# Patient Record
Sex: MALE | Race: WHITE | URBAN - METROPOLITAN AREA
[De-identification: names, ages, dates, MRNs, and addresses within clinical notes are randomized per-mention and may not be internally consistent; named-entity substitution may affect disease eponyms.]

---

## 2017-07-21 ENCOUNTER — OFFICE VISIT (OUTPATIENT)
Dept: FAMILY MEDICINE CLINIC | Facility: CLINIC | Age: 32
End: 2017-07-21

## 2017-07-21 VITALS
HEART RATE: 78 BPM | WEIGHT: 160 LBS | RESPIRATION RATE: 14 BRPM | TEMPERATURE: 98 F | DIASTOLIC BLOOD PRESSURE: 72 MMHG | SYSTOLIC BLOOD PRESSURE: 98 MMHG

## 2017-07-21 DIAGNOSIS — T67.8XXA: Primary | ICD-10-CM

## 2017-07-21 DIAGNOSIS — B35.1 TOENAIL FUNGUS: ICD-10-CM

## 2017-07-21 PROCEDURE — 99202 OFFICE O/P NEW SF 15 MIN: CPT | Performed by: NURSE PRACTITIONER

## 2017-07-21 NOTE — PROGRESS NOTES
CHIEF COMPLAINT:   Patient presents with:  Abdominal Pain      HPI:   Kathleen Sheth is a 28year old male who presents for complaints of mild nausea and fatigue. Symptoms have been present for 1  days. Frequency: intermittent.  Symptoms are improvi THROAT: oral mucosa pink, moist. Posterior pharynx is not erythematous. No exudates. NECK: supple,no adenopathy  LUNGS: clear to auscultation bilaterally. No wheezing, rales, or rhonchi. CARDIO: RRR without murmur  GI: No visible scars or masses.  BS's Nail Fungal Infection  A nail fungal infection changes the way fingernails and toenails look. They may thicken, discolor, change shape, or split. This condition is hard to treat because nails grow slowly and have limited blood supply.  The infection often c Call your healthcare provider right away if any of these occur:  · Skin by the nail becomes red, swollen, painful, or drains pus (a creamy yellow or white liquid)  · Side effects from oral anti-fungal medicines  Date Last Reviewed: 8/1/2016 © 2000-2016 Th · Treat for shock or provide rescue breathing or CPR, if needed. Date Last Reviewed: 7/15/2015  © 9536-2936 [address], [address] [place] [address]. All rights reserved.  This information is not intended as a substitute for pr · Headache or nausea  · Rapid, weak, or irregular pulse  · Feeling dizzy, confused, or delirious  · Fainting  · Convulsions  Treatment: Someone should call for emergency help right away.  While waiting for emergency help, the affected person should:  · Rest

## 2017-07-21 NOTE — PATIENT INSTRUCTIONS
Nail Fungal Infection  A nail fungal infection changes the way fingernails and toenails look. They may thicken, discolor, change shape, or split. This condition is hard to treat because nails grow slowly and have limited blood supply.  The infection often Call your healthcare provider right away if any of these occur:  · Skin by the nail becomes red, swollen, painful, or drains pus (a creamy yellow or white liquid)  · Side effects from oral anti-fungal medicines  Date Last Reviewed: 8/1/2016 © 2000-2016 Th · Treat for shock or provide rescue breathing or CPR, if needed. Date Last Reviewed: 7/15/2015  © 6308-3893 [address], [address] [place] [address]. All rights reserved.  This information is not intended as a substitute for pr · Headache or nausea  · Rapid, weak, or irregular pulse  · Feeling dizzy, confused, or delirious  · Fainting  · Convulsions  Treatment: Someone should call for emergency help right away.  While waiting for emergency help, the affected person should:  · Rest

## 2017-08-24 ENCOUNTER — OFFICE VISIT (OUTPATIENT)
Dept: FAMILY MEDICINE CLINIC | Facility: CLINIC | Age: 32
End: 2017-08-24

## 2017-08-24 VITALS
DIASTOLIC BLOOD PRESSURE: 84 MMHG | BODY MASS INDEX: 25.83 KG/M2 | RESPIRATION RATE: 16 BRPM | SYSTOLIC BLOOD PRESSURE: 120 MMHG | WEIGHT: 155 LBS | OXYGEN SATURATION: 98 % | HEIGHT: 65 IN | TEMPERATURE: 98 F | HEART RATE: 85 BPM

## 2017-08-24 DIAGNOSIS — T78.1XXA ALLERGIC REACTION TO FOOD, INITIAL ENCOUNTER: Primary | ICD-10-CM

## 2017-08-24 PROCEDURE — 99212 OFFICE O/P EST SF 10 MIN: CPT | Performed by: NURSE PRACTITIONER

## 2017-08-24 NOTE — PROGRESS NOTES
CHIEF COMPLAINT:   Patient presents with: Allergic Rxn Allergies (immune): shrimp         HPI:   Abhijeet Saleem is a 28year old male who presents for evaluation of a rash. Per patient rash started in the past this morning.  Rash has been cleared si NEURO: Denies abnormal sensation, tingling of the skin, or numbness.       EXAM:   /84   Pulse 85   Temp 97.7 °F (36.5 °C) (Oral)   Resp 16   Ht 65\"   Wt 155 lb   SpO2 98%   BMI 25.79 kg/m²   GENERAL: well developed, well nourished,in no apparent dis Do not take if need to operate heavy machinery or drive a car or tasks that require mental focus. May take zantac as directed on packaging as well. Do not eat shellfish or shrimp    Any swelling of lips, tongue, or throat seek emergency care. · Avoid tobacco and alcohol because they can make symptoms worse. They can also interact with the medicines you are taking to treat the allergic reaction. · If you know what foods caused your reaction today, avoid them in the future.  The next and each israel · If allergy medicines with diphenhydramine make you too sleepy, talk with your healthcare provider. He or she can recommend an over-the counter antihistamine that won't make you sleepy. These may not work as well, though.   Follow-up care  Follow up with judy DIPHENHYDRAMINE (zamzam Mccoy) is an antihistamine. It is used to treat the symptoms of an allergic reaction. It is also used to treat Parkinson's disease.  This medicine is also used to prevent and to treat motion sickness and as a nighttime sleep · medicines for movement abnormalities or Parkinson's disease  · medicines for sleep  · other medicines for cold, cough or allergy  · some medicines for the stomach like chlordiazepoxide, dicyclomine  What if I miss a dose?   If you miss a dose, take it as You may get drowsy or dizzy. Do not drive, use machinery, or do anything that needs mental alertness until you know how this medicine affects you. Do not stand or sit up quickly, especially if you are an older patient.  This reduces the risk of dizzy or mari · dificultad para orinar  · sangrado o magulladuras inusuales  · cansancio o debilidad inusual  Efectos secundarios que, por lo general, no requieren atención médica (debe informarlos a rae médico o a rae profesional de la monse si persisten o si son [employer] · úlceras u otros problemas estomacales  · abby reacción alérgica o inusual a la difenhidramina, a otros medicamentos, alimentos, colorantes o conservantes tales baldemar sulfitos  · si está embarazada o buscando quedar embarazada  · si está amamantando a un be La DIFENHIDRAMINA es un antihistamínico. Se utiliza para tratar los síntomas de abby reacción alérgica. Se utiliza también para tratar la enfermedad de Parkinson.  Melissa medicamento también se [place] para prevenir y tratar los mareos provocados por el movimi · barbitúricos, baldemar fenobarbital  · medicamentos para los espasmos de la vejiga, tales baldemar oxibutinina, tolterodina  · medicamentos para la presión sanguínea  · medicamentos para la depresión, ansiedad y trastornos psicóticos  · medicamentos para movimie Melissa medicamento puede resecarle los ojos y provocar visión borrosa. Si [place] lentes de contacto, puede sentir ciertas molestias. Las gotas lubricantes pueden ser [employer]. Si el problema no desaparece o es severo, consulte a rae médico de los ojos.   Puede expe 2) Evite usar ropa Jean-Paul y cualquier cosa que aumente la temperatura de la piel (duchas o carole calientes, la gabrielle directa del sol), dado que el calor empeorará la comezón.   3) Puede aplicar compresas de hielo para aliviar áreas locales enrojecidas y con -- [place]inchazón nueva o que va empeorando en la chad, los párpados, los labios, la boca, la garganta o la [place]. -- Silver Bigness. Date Last Reviewed: 7/30/2015  © 1879-2855 The [facility], [address] [place], [place] [address]ClementsMiky Contreras.

## 2017-08-24 NOTE — PATIENT INSTRUCTIONS
Benadryl as directed on packaging. Do not take if need to operate heavy machinery or drive a car or tasks that require mental focus. May take zantac as directed on packaging as well.      Do not eat shellfish or shrimp    Any swelling of lips, tongu · Avoid tobacco and alcohol because they can make symptoms worse. They can also interact with the medicines you are taking to treat the allergic reaction. · If you know what foods caused your reaction today, avoid them in the future.  The next and each israel · If allergy medicines with diphenhydramine make you too sleepy, talk with your healthcare provider. He or she can recommend an over-the counter antihistamine that won't make you sleepy. These may not work as well, though.   Follow-up care  Follow up with judy DIPHENHYDRAMINE (zamzam Slaughter) is an antihistamine. It is used to treat the symptoms of an allergic reaction. It is also used to treat Parkinson's disease.  This medicine is also used to prevent and to treat motion sickness and as a nighttime sleep · medicines for movement abnormalities or Parkinson's disease  · medicines for sleep  · other medicines for cold, cough or allergy  · some medicines for the stomach like chlordiazepoxide, dicyclomine  What if I miss a dose?   If you miss a dose, take it as You may get drowsy or dizzy. Do not drive, use machinery, or do anything that needs mental alertness until you know how this medicine affects you. Do not stand or sit up quickly, especially if you are an older patient.  This reduces the risk of dizzy or mari · dificultad para orinar  · sangrado o magulladuras inusuales  · cansancio o debilidad inusual  Efectos secundarios que, por lo general, no requieren atención médica (debe informarlos a rae médico o a rae profesional de la monse si persisten o si son [employer] · úlceras u otros problemas estomacales  · abby reacción alérgica o inusual a la difenhidramina, a otros medicamentos, alimentos, colorantes o conservantes tales baldemar sulfitos  · si está embarazada o buscando quedar embarazada  · si está amamantando a un be La DIFENHIDRAMINA es un antihistamínico. Se utiliza para tratar los síntomas de abby reacción alérgica. Se utiliza también para tratar la enfermedad de Parkinson.  Melissa medicamento también se [place] para prevenir y tratar los mareos provocados por el movimi · barbitúricos, baldemar fenobarbital  · medicamentos para los espasmos de la vejiga, tales baldemar oxibutinina, tolterodina  · medicamentos para la presión sanguínea  · medicamentos para la depresión, ansiedad y trastornos psicóticos  · medicamentos para movimie Melissa medicamento puede resecarle los ojos y provocar visión borrosa. Si [place] lentes de contacto, puede sentir ciertas molestias. Las gotas lubricantes pueden ser [employer]. Si el problema no desaparece o es severo, consulte a rae médico de los ojos.   Puede expe 2) Evite usar ropa Jean-Paul y cualquier cosa que aumente la temperatura de la piel (duchas o carole calientes, la gabrielle directa del sol), dado que el calor empeorará la comezón.   3) Puede aplicar compresas de hielo para aliviar áreas locales enrojecidas y con -- Hinchazón nueva o que va empeorando en la chad, los párpados, los labios, la boca, la garganta o la [place]. -- Renetta Betts. Date Last Reviewed: 7/30/2015  © 4286-8105 [address], [address] [place], [place] [address]WyldwoodMiky Contreras.

## 2018-03-22 ENCOUNTER — OFFICE VISIT (OUTPATIENT)
Dept: FAMILY MEDICINE CLINIC | Facility: CLINIC | Age: 33
End: 2018-03-22

## 2018-03-22 VITALS
RESPIRATION RATE: 16 BRPM | SYSTOLIC BLOOD PRESSURE: 114 MMHG | TEMPERATURE: 98 F | DIASTOLIC BLOOD PRESSURE: 76 MMHG | BODY MASS INDEX: 27.49 KG/M2 | OXYGEN SATURATION: 97 % | WEIGHT: 165 LBS | HEIGHT: 65 IN | HEART RATE: 73 BPM

## 2018-03-22 DIAGNOSIS — R42 DIZZINESS: Primary | ICD-10-CM

## 2018-03-22 DIAGNOSIS — R11.0 NAUSEA: ICD-10-CM

## 2018-03-22 DIAGNOSIS — R63.8 INSUFFICIENT INTAKE OF FOOD AND WATER: ICD-10-CM

## 2018-03-22 PROCEDURE — 99212 OFFICE O/P EST SF 10 MIN: CPT | Performed by: NURSE PRACTITIONER

## 2018-03-22 RX ORDER — MECLIZINE HCL 12.5 MG/1
12.5 TABLET ORAL 3 TIMES DAILY PRN
Qty: 10 TABLET | Refills: 0 | Status: SHIPPED | OUTPATIENT
Start: 2018-03-22 | End: 2018-03-25

## 2018-03-22 NOTE — PATIENT INSTRUCTIONS
No exercise today, increase water consumption today - at least 8 glasses of water today. Meclizine tablets or capsules  Brand Names: Antivert, Dramamine Less Drowsy, Meni-D  ¿Qué es elizabeth medicamento?   [employer] es un [address] alcohol antihistamínicos para [facility], tos y resfrío ciertos medicamentos para la ansiedad o para conciliar el sueño ciertos medicamentos para la depresión, baldemar amitriptilina, fluoxetina, sertralina ciertos medicamentos para convulsiones, tales baldemar fenob Puede experimentar somnolencia o mareos. No conduzca, no utilice maquinaria ni deejay nada que le exija permanecer en estado de alerta hasta que sepa cómo le afecta elizabeth medicamento.  No se siente ni se ponga de pie con rapidez, especialmente si es un pacient El examen que le hicieron hoy no nos permitió establecer con certeza a qué se debió rae mareo. En ocasiones, es necesario hacer más pruebas para poder encontrar la causa.  Por lo tanto, es importante que deejay las visitas de control con rae médico si los sínto © 7693-5665 The [address]. [facility], [facility]. Todos los derechos reservados. Esta información no pretende sustituir la atención médica profesional. Sólo rae médico puede diagnosticar y tratar un problema de monse. · Si tiene fiebre, [employer] o la jaylon a causa de un resfriado o la gripe, puede usar acetaminofén o ibuprofeno, a menos que le hayan recetado otro medicamento para esto. Si tiene [facility] enfermedad crónica del hígado o de los riñones, o hong teni

## 2018-03-22 NOTE — PROGRESS NOTES
CHIEF COMPLAINT:     Patient presents with:  Dizziness: stable, X this AM, nausea - stable from this AM  :      HPI:     Imer Hubbard is a 28year old male presents with complaints of dizziness. Patient has had symptoms for 1 day.  Patient has had HENT: Denies facial weakness, congestion, rhinorrhea, sore throat, No diminished hearing, No aural fullness, No  tinnitus.   CHEST: Denies chest pain, or palpitations  LUNGS: Denies shortness of breath, cough, or wheezing  GI: Denies abdominal pain, + nause Pt had reported in past some dizziness after not drinking water and working out heavily, as in this current scenario. PT has been working out daily for at least 2.5 hours daily, playing soccer 3 times per week, and has a job in manual labor.  Pt reported no [place] medicamento por vía oral con un vaso de agua. Siga las instrucciones de la etiqueta del [place]. Si está tomando elizabeth medicamento para evitar los mareos por el movimiento, tome la dosis por lo menos 1 hora antes de viajar.  Si el medicamento l ¿Qué sucede si me olvido de abby dosis? Si olvida abby dosis, tómela lo antes posible. Si es callie la hora de la próxima dosis, tome sólo aziza dosis. No tome dosis adicionales o dobles. ¿Dónde black guardar mi medicina?   [place] fuera del alcance de los ni Melissa medicamento puede resecarle los ojos y provocar visión borrosa. Si [place] lentes de contacto, puede sentir ciertas molestias. Las gotas lubricantes pueden ser [employer]. Si el problema no desaparece o es severo, consulte a rae médico de los ojos.   © 2000-201 4) Si hace poco que comenzó a char un medicamento nuevo o le aumentaron la dosis de rae medicamento actual (especialmente, si se trata de un medicamento para tratar la presión arterial [blood pressure medicine]), explique yasmani síntomas al [employer] rece Los síntomas incluyen tener sed y [employer]. También es posible que se sienta mareado, débil, fatigado o muy somnoliento. La dieta que se detalla más abajo suele ser suficiente para tratar la deshidratación.  En algunos casos, es posible que se necesiten · Se siente somnoliento o confundido de abby manera inusual  · Morales cantidad de [facility] o exceso de sed  · Yobany de 100.4º F (38º C) o más  Date Last Reviewed: 5/31/2015  © 6141-8355 The [address]. [address], 1612 [place]olis[place] Contreras.  My [place]

## 2018-07-02 ENCOUNTER — OFFICE VISIT (OUTPATIENT)
Dept: FAMILY MEDICINE CLINIC | Facility: CLINIC | Age: 33
End: 2018-07-02

## 2018-07-02 VITALS
HEART RATE: 80 BPM | TEMPERATURE: 98 F | WEIGHT: 143 LBS | DIASTOLIC BLOOD PRESSURE: 74 MMHG | BODY MASS INDEX: 24.41 KG/M2 | HEIGHT: 64 IN | SYSTOLIC BLOOD PRESSURE: 116 MMHG | OXYGEN SATURATION: 98 % | RESPIRATION RATE: 20 BRPM

## 2018-07-02 DIAGNOSIS — T73.3XXA FATIGUE DUE TO EXCESSIVE EXERTION, INITIAL ENCOUNTER: ICD-10-CM

## 2018-07-02 DIAGNOSIS — R63.4 RECENT UNEXPLAINED WEIGHT LOSS: ICD-10-CM

## 2018-07-02 DIAGNOSIS — E63.9 INADEQUATE ORAL NUTRITIONAL INTAKE: Primary | ICD-10-CM

## 2018-07-02 DIAGNOSIS — R63.8 INSUFFICIENT INTAKE OF FOOD AND WATER: ICD-10-CM

## 2018-07-02 PROCEDURE — 99213 OFFICE O/P EST LOW 20 MIN: CPT

## 2018-07-02 NOTE — PROGRESS NOTES
Gris Galeana is a 35year old male who presents with fatigue. Pt missed work today and needs a work note    HPI:   Symptoms for 3 days .  Feels lightheaded at times, but not today  Stools described as normal ,Number of stools: once daily,Blood in st : no dysuria; urine color reports it is yellow, goes about 3 times a day  MUSCULOSKELETAL: no myalgia  NEURO: denies headaches today, but has them when it is hot      EXAM:   /74   Pulse 80   Temp 98 °F (36.7 °C) (Oral)   Resp 20   Ht 64\"   Wt 143 Core body temperature stays at 98.6°F (37°C). It isn't dangerous unless the symptoms aren't treated.  Signs and symptoms include:  · Sweating a lot  · Having painful spasms in your muscles during activity or hours afterward (heat cramps)  · Developing tiny · Drink water or a sport drink. (Do not try to give a drink to someone who is unconscious.)  Date Last Reviewed: 12/26/2015  © 5122-0111 The [address]. [facility], [address] [place] [address]. All rights reserved.  This information is not inte

## 2018-07-03 NOTE — PATIENT INSTRUCTIONS
Heat Stress: Warning Signs  Even severe heat stress can appear suddenly, so learn the warning signs and how to treat them. Mild: heat stress  Core body temperature stays at 98.6°F (37°C). It isn't dangerous unless the symptoms aren't treated.  Signs and · Have clothing soaked with cool water. Or, remove outer clothing and be wrapped with a sheet soaked in cool water. Place the person in water in a tub or children's swimming pool if available. · Be blown with fans. · Drink water or a sport drink.  (Do not

## (undated) NOTE — LETTER
Date: 8/24/2017    Patient Name: Kasey Hammond          To Whom it may concern: This letter has been written at the patient's request. The above patient was seen at the [facility] for treatment of a medical condition.     This patien

## (undated) NOTE — LETTER
Date: 7/2/2018    Patient Name: Yvonne Cosby          To Whom it may concern: This letter has been written at the patient's request. The above patient was seen at the [facility] for treatment of a medical condition.     This patie

## (undated) NOTE — LETTER
Date: 3/22/2018    Patient Name: Dinah Page          To Whom it may concern: This letter has been written at the patient's request. The above patient was seen at the [facility] for treatment of a medical condition.     This patien

## (undated) NOTE — LETTER
Date: 7/21/2017    Patient Name: Kathleen Sheth          To Whom it may concern: This letter has been written at the patient's request. The above patient was seen at the [facility] for treatment of a medical condition.     This patien